# Patient Record
Sex: FEMALE | Race: WHITE | URBAN - METROPOLITAN AREA
[De-identification: names, ages, dates, MRNs, and addresses within clinical notes are randomized per-mention and may not be internally consistent; named-entity substitution may affect disease eponyms.]

---

## 2022-02-23 ENCOUNTER — OFFICE VISIT (OUTPATIENT)
Dept: URBAN - METROPOLITAN AREA CLINIC 98 | Facility: CLINIC | Age: 32
End: 2022-02-23

## 2022-03-17 ENCOUNTER — WEB ENCOUNTER (OUTPATIENT)
Dept: URBAN - METROPOLITAN AREA CLINIC 98 | Facility: CLINIC | Age: 32
End: 2022-03-17

## 2022-03-17 ENCOUNTER — OFFICE VISIT (OUTPATIENT)
Dept: URBAN - METROPOLITAN AREA CLINIC 98 | Facility: CLINIC | Age: 32
End: 2022-03-17
Payer: COMMERCIAL

## 2022-03-17 ENCOUNTER — DASHBOARD ENCOUNTERS (OUTPATIENT)
Age: 32
End: 2022-03-17

## 2022-03-17 VITALS
HEIGHT: 67 IN | SYSTOLIC BLOOD PRESSURE: 118 MMHG | TEMPERATURE: 97.2 F | WEIGHT: 217 LBS | DIASTOLIC BLOOD PRESSURE: 82 MMHG | HEART RATE: 106 BPM | BODY MASS INDEX: 34.06 KG/M2

## 2022-03-17 DIAGNOSIS — R11.2 NAUSEA AND VOMITING IN ADULT: ICD-10-CM

## 2022-03-17 PROCEDURE — 99204 OFFICE O/P NEW MOD 45 MIN: CPT | Performed by: INTERNAL MEDICINE

## 2022-03-17 RX ORDER — PROMETHAZINE HYDROCHLORIDE 12.5 MG/1
1 SUPPOSITORY AS NEEDED SUPPOSITORY RECTAL
Qty: 20 | Refills: 1 | OUTPATIENT
Start: 2022-03-17 | End: 2022-03-27

## 2022-03-17 NOTE — HPI-TODAY'S VISIT:
Ms. Willis is a 32 yo F presenting for new patient visit for vomiting.   For years she has had intermittent nausea and vomiting. It has been going on for a few years. She can happen after she gets an infection from food, she says. She lived in [facility] in [place] and had to be hospitalized for vomiting in the past.   She has some looser stools that are smaller when she has the vomiting episodes. She now has a BM most days of the week when she does not have a flare.   She had an EGD in [facility] in [place] for this which was normal.   She had COVID in January 2022. She then started vomiting intermittently again. It resolved on it's own after about 7 weeks. She was hospitalized for 3-4 days during this time and was given IV fluids and antiemetics.   She does take hydroxyzine and sertaline regularly.   She uses marijuana nightly, 5 days per week. This has been going on since age 18.   Rare NSAID use.   She sometimes has migraines monthly that set off vomiting.